# Patient Record
(demographics unavailable — no encounter records)

---

## 2024-10-15 NOTE — PHYSICAL EXAM
[TextEntry] : Constitutional: Well  appearing, not in acute distress Head: Normocephalic, no lesions Eyes: PERRLA, conjunctiva is NL, clear Ear: Ear canal is normal, tympanic membrane is intact Nose: Nasal turbinates are NL Throat: Clear, no exudates, no lesions Neck: Supple, no masses Chest: Lungs are clear, no rales, no rhonchi, no wheezing Heart: Regular rate, no murmurs Abdomen: Soft, no tenderness, has an enlarged uterus(palpable 1.5 cm below the umbillicus) : No CVAT Skin:  Has ? milia vs xanthelasma on the L upper eyelid and few on the R upper eyelid Neuro: AAO x 3, no focal neurological deficit.

## 2024-10-15 NOTE — PLAN
[FreeTextEntry1] : Ms. WILBUR CASTELLON 40 year female with a PMH Anemia, mild hyperlipidemia, vitamin D deficiency present to the office for a f/u on anemia F/u exam is performed. Recommend  to do a blood test today to f/u on anemia, iron level Meanwhile continue present meds Anemia continue iron supplements with vit C, f/u with hematologist F/u with GI for EGD, colonoscopy Mild hyperlipidemia recommend  low cholesterol diet, start doing exercise to improve HDL F/u with a dermatologist  RTC to f/u in 2 wks. Patient is verbalized understanding

## 2024-10-15 NOTE — REVIEW OF SYSTEMS
[Negative] : Neurological [Fever] : no fever [Chills] : no chills [Abdominal Pain] : no abdominal pain [Nausea] : no nausea [Constipation] : no constipation [Vomiting] : no vomiting [Heartburn] : no heartburn [FreeTextEntry2] : has an improve  fatique

## 2024-10-15 NOTE — HISTORY OF PRESENT ILLNESS
As per last admission, Urinalysis 1/10/22 : carbapenem resistant Pseudomonas ( not treated at that time)  Pt presenting w/ symptoms of dysuria and lower abdominal pain   straight cath Urinalysis and UCx  c/w rocephin [FreeTextEntry1] : F/u exam [de-identified] : Ms. WILBUR CASTELLON 40 year female with a PMH Anemia, mild hyperlipidemia, vitamin D deficiency present to the office for a f/u. As per patient on 08/19/24 had an abdominal myomectomy at [facility]. Today came to f/u on anemia, needs to repeat cbc and iron study. At present take iron tab with vit C. Recently was seen by a GI, scheduled for an EGD and colonoscopy on 11/04/2024. Has an upcoming appointment with hematologist on 10/22/24 Lovenox 40 BID (wt based) Lovenox 40 BID (wt based) Lovenox 40 BID (wt based) As per last admission, Urinalysis 1/10/22 : carbapenem resistant Pseudomonas ( not treated at that time)  Pt presenting w/ symptoms of dysuria and lower abdominal pain   straight cath Urinalysis and UCx  c/w rocephin Lovenox 40 BID (wt based)

## 2025-01-14 NOTE — PLAN
[FreeTextEntry1] : Ms. WILBUR CASTELLON 40 year female with a PMH Anemia, mild hyperlipidemia, vitamin D deficiency present to the office for a f/u.  F/u exam is performed. Recommend  to do a blood test today, further management will depend on the blood test results.  Meanwhile continue present meds Heartburn continue pepcid Anemia continue iron supplements with vit C, f/u with hematologist Mild hyperlipidemia recommend  low cholesterol diet, start doing exercise to improve HDL  RTC to f/u in 2 wks. Patient is verbalized understanding

## 2025-01-14 NOTE — PHYSICAL EXAM
[TextEntry] : Constitutional: Well  appearing, not in acute distress Head: Normocephalic, no lesions Eyes: PERRLA, conjunctiva is NL, clear Ear: Ear canal is normal, tympanic membrane is intact Nose: Nasal turbinates are NL Throat: Clear, no exudates, no lesions Neck: Supple, no masses Chest: Lungs are clear, no rales, no rhonchi, no wheezing Heart: Regular rate, no murmurs Abdomen: Soft, no tenderness : No CVAT Neuro: AAO x 3, no focal neurological deficit.

## 2025-01-14 NOTE — HISTORY OF PRESENT ILLNESS
[FreeTextEntry1] : F/u exam [de-identified] : Ms. WILBUR CASTELLON 40 year female with a PMH Anemia, mild hyperlipidemia, vitamin D deficiency present to the office for a f/u. Wants to get referral to see an ENT. Has h/o chronic sinusitis.  Also want to get  medication to loose weight   As per patient on 08/19/24 had an abdominal myomectomy at [facility].  Seen by a GI, had an EGD and colonoscopy on 11/04/2024.  Has an upcoming appointment with hematologist on 10/22/24

## 2025-03-11 NOTE — END OF VISIT
[FreeTextEntry3] : I personally saw and examined the patient in detail. I spoke to KRISHNA Mckenna regarding the assessment and plan of care.  I preformed the procedures and I reviewed the above assessment and plan of care, and agree. I have made changes in changes in the body of the note where appropriate.

## 2025-03-11 NOTE — CONSULT LETTER
[FreeTextEntry1] : Dear Dr. CARITO TERRAZAS    I had the pleasure of evaluating your patient WILBUR CASTELLON, thank you for allowing us to participate in their care. please see full note detailing our visit below.  If you have any questions, please do not hesitate to call me and I would be happy to discuss further.       Husam Madera M.D.   Attending Physician,     Department of Otolaryngology - Head and Neck Surgery   [facility]    Office: [phone]   Fax: [phone]

## 2025-03-11 NOTE — PHYSICAL EXAM
[Nasal Endoscopy Performed] : nasal endoscopy was performed, see procedure section for findings [Normal] : no abnormal secretions [Laryngoscopy Performed] : laryngoscopy was performed, see procedure section for findings

## 2025-03-11 NOTE — ASSESSMENT
[FreeTextEntry1] : 40 year old female presents with sinus pressure and nasal congestion. On exam, BITH, mild L DNS  Discussed options: 1) conservative management with nasal sprays and washes  2) sinusitis regiment  - Allergy evaluation and possible treatments discussed. Additionally, we will proceed with a regiment of decongestants and medication to reduce inflammation and an extended course of antibiotics. The patient was instructed to continue nasal irrigation and topical steroid spray for over 1 month. The goal is to try to break the cycle of inflammation and obstruction leading to resolution of the acute and chronic symptoms. It will hopefully allow for maintenance therapy to reach disease areas and avoid further intervention. 3) CT scan of sinuses for further evaluation  - patient elected for option 3, will call to discuss results - if + for sinus disease will send in course of abx and steroids  - continue Flonase. A topical steroid reduce mucosal swelling, illustrated appropriate use and how to reduce the risk of bleeding    - Nasal irrigation and showed how to use it to maximize effectiveness - follow up in 3-4 weeks   Also with post nasal drip and throat clearing. mild acid reflux seen on scope. - will proceed to start lifestyle regiment to reduce overproduction of acid and reduce laryngeal reflux including avoiding caffein, alcohol, eating before bed, spicy and fatty foods, and head elevation at night etc. Handout detailing regiment also given

## 2025-03-11 NOTE — PROCEDURE
[de-identified] : Husam Madera M.D.  [de-identified] : Procedure performed: Nasal Endoscopy- Diagnostic Pre-op indication(s): nasal congestion Post-op indication(s): nasal congestion  Verbal and/or written consent obtained from patient Anterior rhinoscopy insufficient to account for symptoms Scope #: 3,  flexible fiber optic telescope  The scope was introduced in the nasal passage between the middle and inferior turbinates to exam the inferior portion of the middle meatus and the fontanelle, as well as the maxillary ostia.  Next, the scope was passed medically and posteriorly to the middle turbinates to examine the sphenoethmoid recess and the superior turbinate region. Upon visualization the finders are as follows: Nasal Septum: sigmoidal septal deviation Bilateral - Mucosa: boggy turbinates, Mucous: scant, Polyp: not seen, Inferior Turbinate: boggy, Middle Turbinate: normal, Superior Turbinate: normal, Inferior Meatus: narrow, Middle Meatus: narrow, Super Meatus:normal, Sphenoethmoidal Recess: clear    [de-identified] : Laryngoscopy: Procedure performed: laryngeal Endoscopy- Diagnostic Pre-op/post op indication: dysphonia Verbal and/or written consent obtained from patient, Patient was unable to cooperate with mirror Scope #: 3, flexible fiber optic telescope used Scope was introduced through the nose passed on the floor of the nose to the nasopharynx and then followed down the soft palate to the lower pharynx. The tongue Base, Larynx, Hypopharynx were examined. Base of tongue was symmetric, vallecular was clear, epiglottis was not deformed, subglottis/ pyriform and posterior pharyngeal walls were clear. No erythema, edema, pooling of secretions, masses or lesions. Airway patent, no foreign body visualized. No glottic/supraglottic edema. True vocal cords, arytenoids, vestibular folds, ventricles, pyriform sinuses, and aryepiglottic folds appear normal bilaterally. Vocal cords mobile with good contact b/l.  r

## 2025-03-11 NOTE — HISTORY OF PRESENT ILLNESS
[de-identified] : 40 year old female presents for evaluation of sinus pressure behind eyes for many years. States recently has been more bothersome, States when gets a cold, sinus pressure gets much worse. Also with stuffy nose, has to blow her nose constantly. In last 12 months has been on 2-3 courses of oral antibiotics, and 1 course of oral steroids. States antibiotics help for a short time but sinus pressure does not completely go away. Denies previous allergy testing, does think she has a pollen and dust allergy. Gets more stuffy at night, takes zyrtec D everyday. Uses flonase daily for years. Uses saline washes when shes sick. Sense of smell is good.   has been going on like this for years   Also with constant throat clearing and post nasal drip.   States has constant ear pressure. no Vertigo, tinnitus, drainage or facial weakness.

## 2025-04-01 NOTE — PLAN
[FreeTextEntry1] : Ms. WILBUR CASTELLON 40 year female with a PMH Anemia, mild hyperlipidemia, vitamin D deficiency present to the office for a f/u.  F/u exam is performed. Recommend  to do a blood test another time fasting  further management will depend on the blood test results.  Irregular period , do blood test for HCG - f/u with a gyn Heartburn continue pepcid prn Anemia continue iron supplements with vit C, f/u with hematologist Mild hyperlipidemia recommend  low cholesterol diet  RTC to f/u in 2 wks after a blood test. Patient is verbalized understanding

## 2025-04-01 NOTE — HISTORY OF PRESENT ILLNESS
[FreeTextEntry1] : F/u exam [de-identified] : Ms. WILBUR CASTELLON 40 year female with a PMH Anemia, mild hyperlipidemia, vitamin D deficiency present to the office for a f/u. Patient is c/o forgetfulness  on/off for several mo. Also  missed her period, LMP was on 02/25/25. Gain weight, wants to start medication for a weight loss.    As per patient on 08/19/24 had an abdominal myomectomy at [facility].  Seen by a GI, had an EGD and colonoscopy on 11/04/2024.

## 2025-04-01 NOTE — REVIEW OF SYSTEMS
[Negative] : Integumentary [Headache] : no headache [Dizziness] : no dizziness [Unsteady Walking] : no ataxia [de-identified] : c/o forgetfulness on/off hard copy, drawn during this pregnancy

## 2025-04-23 NOTE — PROCEDURE
[de-identified] : Husam Madera M.D.  [de-identified] : Procedure performed: Nasal Endoscopy- Diagnostic Pre-op indication(s): nasal congestion Post-op indication(s): nasal congestion  Verbal and/or written consent obtained from patient Anterior rhinoscopy insufficient to account for symptoms Scope #: 3,  flexible fiber optic telescope  The scope was introduced in the nasal passage between the middle and inferior turbinates to exam the inferior portion of the middle meatus and the fontanelle, as well as the maxillary ostia.  Next, the scope was passed medically and posteriorly to the middle turbinates to examine the sphenoethmoid recess and the superior turbinate region. Upon visualization the finders are as follows: Nasal Septum: sigmoidal septal deviation Bilateral - Mucosa: boggy turbinates, Mucous: scant, Polyp: not seen, Inferior Turbinate: boggy, Middle Turbinate: normal, Superior Turbinate: normal, Inferior Meatus: narrow, Middle Meatus: narrow, Super Meatus:normal, Sphenoethmoidal Recess: clear

## 2025-04-23 NOTE — PROCEDURE
[de-identified] : Husam Madera M.D.  [de-identified] : Procedure performed: Nasal Endoscopy- Diagnostic Pre-op indication(s): nasal congestion Post-op indication(s): nasal congestion  Verbal and/or written consent obtained from patient Anterior rhinoscopy insufficient to account for symptoms Scope #: 3,  flexible fiber optic telescope  The scope was introduced in the nasal passage between the middle and inferior turbinates to exam the inferior portion of the middle meatus and the fontanelle, as well as the maxillary ostia.  Next, the scope was passed medically and posteriorly to the middle turbinates to examine the sphenoethmoid recess and the superior turbinate region. Upon visualization the finders are as follows: Nasal Septum: sigmoidal septal deviation Bilateral - Mucosa: boggy turbinates, Mucous: scant, Polyp: not seen, Inferior Turbinate: boggy, Middle Turbinate: normal, Superior Turbinate: normal, Inferior Meatus: narrow, Middle Meatus: narrow, Super Meatus:normal, Sphenoethmoidal Recess: clear

## 2025-04-23 NOTE — CONSULT LETTER
[FreeTextEntry1] : Dear Dr. CARITO TERRAZAS    I had the pleasure of evaluating your patient WILBUR CASTELLON, thank you for allowing us to participate in their care. please see full note detailing our visit below.  If you have any questions, please do not hesitate to call me and I would be happy to discuss further.       Husam Madera M.D.   Attending Physician,     Department of Otolaryngology - Head and Neck Surgery   [facility]    Office: [phone]   Fax: [phone]

## 2025-04-23 NOTE — CONSULT LETTER
[FreeTextEntry1] : Dear Dr. CARITO TERRAZAS    I had the pleasure of evaluating your patient WILBUR CASTELLON, thank you for allowing us to participate in their care. please see full note detailing our visit below.  If you have any questions, please do not hesitate to call me and I would be happy to discuss further.       Husam Madera M.D.   Attending Physician,     Department of Otolaryngology - Head and Neck Surgery   On license of [facility]    Office: [phone]   Fax: [phone]

## 2025-04-23 NOTE — HISTORY OF PRESENT ILLNESS
[de-identified] : 40 year old female presents for evaluation of sinus pressure behind eyes for many years. States recently has been more bothersome, States when gets a cold, sinus pressure gets much worse. Also with stuffy nose, has to blow her nose constantly. In last 12 months has been on 2-3 courses of oral antibiotics, and 1 course of oral steroids. States antibiotics help for a short time but sinus pressure does not completely go away. Denies previous allergy testing, does think she has a pollen and dust allergy. Gets more stuffy at night, takes zyrtec D everyday. Uses flonase daily for years. Uses saline washes when shes sick. Sense of smell is good.   has been going on like this for years   Also with constant throat clearing and post nasal drip.   States has constant ear pressure. no Vertigo, tinnitus, drainage or facial weakness.   [FreeTextEntry1] : Patient is following up to review CT scan of sinuses which showed mucosal thickening at left maxillary sinus, otherwise clear. States she has had 4-5 courses of oral antibiotics in last year. States had severe left sided facial pressure one week ago which lasted a week, also left>right ear pressure which lasted a week. Always has sinus pressure and ear pressure, but states its worse when shes infected. Took zyrtec D yesterday, wants to get allergy tested. Using flonase and sinus washes daily for last 6 weeks.  Denies heart of lung problems. Denies diabetes. Denies allergies to medications. Denies taking blood thinners.

## 2025-04-23 NOTE — HISTORY OF PRESENT ILLNESS
[de-identified] : 40 year old female presents for evaluation of sinus pressure behind eyes for many years. States recently has been more bothersome, States when gets a cold, sinus pressure gets much worse. Also with stuffy nose, has to blow her nose constantly. In last 12 months has been on 2-3 courses of oral antibiotics, and 1 course of oral steroids. States antibiotics help for a short time but sinus pressure does not completely go away. Denies previous allergy testing, does think she has a pollen and dust allergy. Gets more stuffy at night, takes zyrtec D everyday. Uses flonase daily for years. Uses saline washes when shes sick. Sense of smell is good.   has been going on like this for years   Also with constant throat clearing and post nasal drip.   States has constant ear pressure. no Vertigo, tinnitus, drainage or facial weakness.   [FreeTextEntry1] : Patient is following up to review CT scan of sinuses which showed mucosal thickening at left maxillary sinus, otherwise clear. States she has had 4-5 courses of oral antibiotics in last year. States had severe left sided facial pressure one week ago which lasted a week, also left>right ear pressure which lasted a week. Always has sinus pressure and ear pressure, but states its worse when shes infected. Took zyrtec D yesterday, wants to get allergy tested. Using flonase and sinus washes daily for last 6 weeks.  Denies heart of lung problems. Denies diabetes. Denies allergies to medications. Denies taking blood thinners.

## 2025-04-23 NOTE — ASSESSMENT
[FreeTextEntry1] : 40 year old female presents with sinus pressure and nasal congestion. On exam, BITH, mild L DNS.  - continue Flonase. A topical steroid reduce mucosal swelling, illustrated appropriate use and how to reduce the risk of bleeding    - Nasal irrigation and showed how to use it to maximize effectiveness   Patient with chronic sinusitis for over 3 months that has been refractory to medical management. They have had over two courses of treatment including by me with extended course of consecutive treatment, PO steroids. They also tried medicated nasal sprays and irrigation over a one-month duration. We discussed allergy testing and treatment. On reviewed of the CT scan of their sinuses that showed evidence of sinus disease in the following sinuses: left maxillary sinus    Discussed options: 1) continue conservative management with nasal sprays and washes, antibiotics and steroids when infected  2) office procedure for left maxillary sinus  Patient elected to do office procedure for left max.   - Risks benefits and alternatives of endoscopic sinus surgery with possible image guidance, septoplasty bilateral inferior turbinate reduction discussed with patient at length. Risks discussed include but were not limited to bleeding, infection, persistent symptoms, scarring, injury to the skull base and brain and CSF leak, injury to orbit and eye, crusting, septal hematoma, septal perforation results in whistling, empty nose syndrome, crusting and bleeding as well as continued nasal obstruction etc.  were discussed. For polyps, discussed very high recurrence rate that require future surveillance, maintenance and likelihood of need for further procedures. Also discussed option of office balloon/hybrid balloon dilation and office sinus surgery - similar risk profile, will not address septum/ turbs and takes a generally more conservative approach with quicker recovery, however higher possibility for need for future intervention. - will book left maxillary sinus in office  - does fine at dentist, ASA 1 per history